# Patient Record
Sex: MALE | Race: OTHER | Employment: FULL TIME | URBAN - METROPOLITAN AREA
[De-identification: names, ages, dates, MRNs, and addresses within clinical notes are randomized per-mention and may not be internally consistent; named-entity substitution may affect disease eponyms.]

---

## 2021-04-13 PROBLEM — I47.1 PAROXYSMAL SVT (SUPRAVENTRICULAR TACHYCARDIA) (HCC): Status: ACTIVE | Noted: 2020-08-12

## 2021-04-26 ENCOUNTER — HOSPITAL ENCOUNTER (OUTPATIENT)
Dept: ULTRASOUND IMAGING | Age: 36
Discharge: HOME | End: 2021-04-26
Attending: FAMILY MEDICINE
Payer: COMMERCIAL

## 2021-04-26 DIAGNOSIS — R79.89 ELEVATED LFTS: ICD-10-CM

## 2021-04-26 DIAGNOSIS — F10.11 HISTORY OF ALCOHOL ABUSE: ICD-10-CM

## 2021-04-26 PROCEDURE — 76705 ECHO EXAM OF ABDOMEN: CPT

## 2021-04-28 PROBLEM — E78.5 HYPERLIPIDEMIA: Status: ACTIVE | Noted: 2021-04-28

## 2021-04-28 PROBLEM — E11.65 TYPE 2 DIABETES MELLITUS WITH HYPERGLYCEMIA, WITHOUT LONG-TERM CURRENT USE OF INSULIN (HCC): Status: ACTIVE | Noted: 2021-04-28

## 2021-04-28 PROBLEM — R79.89 ELEVATED LFTS: Status: ACTIVE | Noted: 2021-04-28

## 2021-06-02 ENCOUNTER — HOSPITAL ENCOUNTER (OUTPATIENT)
Dept: DIABETES SERVICES | Age: 36
Discharge: HOME | End: 2021-06-02
Payer: COMMERCIAL

## 2021-06-02 PROCEDURE — G0108 DIAB MANAGE TRN  PER INDIV: HCPCS

## 2021-06-02 NOTE — PROGRESS NOTES
This is a one on one appointment. Due to being during EFIRG-50 public health emergency, social distancing and mandatory precautions are in place and utilized. Came for diabetes educational assessment today. Provided basic information on carbohydrates, proteins and fats. Educational need/plan: Will attend 2 nutrition/2 diabetes sessions to address the following: diabetes disease process, nutritional management, physical activity, using medications, preventing complications, psychosocial adjustment, goal setting, problem solving, monitoring, behavior change strategies. Hopes to gain the following from this educational program:  Being active, healthy coping, healthy eating, monitoring blood sugars, problem solving, taking medications and reducing risk of complications. Issues Identified: Newly diagnosed this year. He is interested in Continuous Glucose Monitor. Instructed to test four times a day in case insurance asks for letter of medical necessity from Primary Care Physician. Dexcom G6 should be covered under prescription benefit. Medication Reconciliation completed at today's visit.

## 2021-06-04 ENCOUNTER — HOSPITAL ENCOUNTER (OUTPATIENT)
Dept: DIABETES SERVICES | Age: 36
Discharge: HOME | End: 2021-06-04
Payer: COMMERCIAL

## 2021-06-04 PROCEDURE — G0109 DIAB MANAGE TRN IND/GROUP: HCPCS

## 2021-06-04 NOTE — PROGRESS NOTES
This is a class  appointment with limited persons allowed in class due to VEXNX-02 public health emergency. Social distancing and mandatory precautions are in place and utilized. Attended nutrition diabetes #1 group session today. Topics included: disease process and treatment; diet factors impacting blood sugars including food choices, meal timing and portions, carbohydrate choices (emphasizing high fiber carbohydrates); proteins (emphasizing heart healthy choices) and fat food choices (emphasizing unsaturated fats); free foods; combination food choices; nutrition tips for persons with diabetes; snack ideas; resources for diabetes management. Two methods of meal planning were reviewed: carbohydrate choices and carbohydrate counting. Basics of exercise discussed. Voiced /demonstrated understanding of material covered. Anticipated adherence is good. Pt engaged. Problems/barriers may be: none anticipated    No medication changes since last visit. No new procedure or surgery since last visit.

## 2021-06-14 ENCOUNTER — HOSPITAL ENCOUNTER (OUTPATIENT)
Dept: DIABETES SERVICES | Age: 36
Discharge: HOME | End: 2021-06-14
Payer: COMMERCIAL

## 2021-06-14 PROCEDURE — G0109 DIAB MANAGE TRN IND/GROUP: HCPCS

## 2021-06-14 NOTE — PROGRESS NOTES
This is a class  appointment with limited persons allowed in class due to HFPIS-14 public health emergency. Social distancing and mandatory precautions are in place and utilized. Participant attended Diabetes #1 session today. Topics included: Characteristics/pathophysiology type 1/type 2 diabetes; Goal/acceptable blood glucose ranges/Hgb A1C/interpreting/using results;meters, continuous glucose monitors and insulin pumps. Using medications safely; Sick day management; Prevention/detection/treatment of acute complications. - Verbalized understanding of material covered.  -Anticipated adherence is good   -Problems/barriers may be  none anticipated   Personal- Still interested in Dexcom G6 continuous glucose monitoring system. Newly diagnosed this year. Medication Reconciliation Completed. No surgery or procedure.

## 2021-07-21 ENCOUNTER — HOSPITAL ENCOUNTER (OUTPATIENT)
Dept: DIABETES SERVICES | Age: 36
Discharge: HOME | End: 2021-07-21
Payer: COMMERCIAL

## 2021-07-21 PROCEDURE — G0109 DIAB MANAGE TRN IND/GROUP: HCPCS

## 2021-07-22 NOTE — PROGRESS NOTES
Late entry for 7-. This is a class  appointment with limited persons allowed in class due to [facility]-53 public health emergency. Social distancing and mandatory precautions are in place and utilized. Participant attended Diabetes #2 session today. Topics included: Prevention/detection/treatment of chronic complications; sleep apnea; Developing strategies to promote health/change behavior/recommended screenings; Developing strategies to address psychosocial issues; Goal setting. Participants goal/support plan includes:        Diabetes Goal: To lose weight, I will take walks every day at the park for thirty minutes starting 7-. I will reevaluate in 3 months and increase as tolerated. Diabetes Support Plan: Refer to Diabetes Education Material.         Problems/barriers may be:none anticipated         Plan for follow up/Recommendations: Nutrition Two                   Medication Reconciliation Completed. No new surgery or procedures.

## 2021-08-03 ENCOUNTER — TELEPHONE (OUTPATIENT)
Dept: DIABETES SERVICES | Age: 36
End: 2021-08-03

## 2021-08-30 ENCOUNTER — TELEPHONE (OUTPATIENT)
Dept: DIABETES SERVICES | Age: 36
End: 2021-08-30

## 2021-08-30 NOTE — TELEPHONE ENCOUNTER
Called pt and rescheduled his last nutrition diabetes class for 9/7/21. Pt also scheduled for f/u class on 9/23/21.

## 2021-09-07 ENCOUNTER — HOSPITAL ENCOUNTER (OUTPATIENT)
Dept: DIABETES SERVICES | Age: 36
Discharge: HOME | End: 2021-09-07
Payer: COMMERCIAL

## 2021-09-07 PROCEDURE — G0109 DIAB MANAGE TRN IND/GROUP: HCPCS

## 2021-09-07 NOTE — PROGRESS NOTES
This is a class  appointment with limited persons allowed in class due to [place] [address] public health emergency. Social distancing and mandatory precautions are in place and utilized. Attended nutrition diabetes #2 group session today. Topics included: plate method for portion control; fiber and sodium guidelines; sugar substitutes; alcohol; eating out; recipe modification; label reading. Participant's goal: To improve time in range he will choose at meal times less than 60 grams of carbohydrates at home and out and re-evaluate in 3 months. Participant's diabetes support plan: His own weight loss plan. Barriers identified: None per pt. Voiced/demonstrated understanding of material covered. Anticipated adherence is good. No medication changes, procedures or surgeries since last visit. Plan for follow up is: will be sent a follow up questionnaire at 6 months and one year.